# Patient Record
Sex: FEMALE | Race: WHITE | NOT HISPANIC OR LATINO | Employment: OTHER | URBAN - METROPOLITAN AREA
[De-identification: names, ages, dates, MRNs, and addresses within clinical notes are randomized per-mention and may not be internally consistent; named-entity substitution may affect disease eponyms.]

---

## 2023-10-09 ENCOUNTER — OFFICE VISIT (OUTPATIENT)
Dept: ORTHOPEDIC SURGERY | Facility: CLINIC | Age: 68
End: 2023-10-09
Payer: MEDICARE

## 2023-10-09 ENCOUNTER — ANCILLARY PROCEDURE (OUTPATIENT)
Dept: RADIOLOGY | Facility: CLINIC | Age: 68
End: 2023-10-09
Payer: MEDICARE

## 2023-10-09 DIAGNOSIS — M18.0 OSTEOARTHRITIS OF CARPOMETACARPAL (CMC) JOINTS OF BOTH THUMBS, UNSPECIFIED OSTEOARTHRITIS TYPE: Primary | ICD-10-CM

## 2023-10-09 DIAGNOSIS — G56.03 BILATERAL CARPAL TUNNEL SYNDROME: ICD-10-CM

## 2023-10-09 DIAGNOSIS — M18.0 OSTEOARTHRITIS OF CARPOMETACARPAL (CMC) JOINTS OF BOTH THUMBS, UNSPECIFIED OSTEOARTHRITIS TYPE: ICD-10-CM

## 2023-10-09 PROCEDURE — 73130 X-RAY EXAM OF HAND: CPT | Mod: BILATERAL PROCEDURE | Performed by: RADIOLOGY

## 2023-10-09 PROCEDURE — L3924 HFO WITHOUT JOINTS PRE OTS: HCPCS | Performed by: PHYSICIAN ASSISTANT

## 2023-10-09 PROCEDURE — 73130 X-RAY EXAM OF HAND: CPT | Mod: 50,FY

## 2023-10-09 RX ORDER — LIDOCAINE HYDROCHLORIDE 10 MG/ML
20 INJECTION INFILTRATION; PERINEURAL
Status: COMPLETED | OUTPATIENT
Start: 2023-10-09 | End: 2023-10-09

## 2023-10-09 RX ORDER — TRIAMCINOLONE ACETONIDE 40 MG/ML
20 INJECTION, SUSPENSION INTRA-ARTICULAR; INTRAMUSCULAR
Status: COMPLETED | OUTPATIENT
Start: 2023-10-09 | End: 2023-10-09

## 2023-10-09 RX ADMIN — LIDOCAINE HYDROCHLORIDE 20 ML: 10 INJECTION INFILTRATION; PERINEURAL at 16:40

## 2023-10-09 RX ADMIN — TRIAMCINOLONE ACETONIDE 20 MG: 40 INJECTION, SUSPENSION INTRA-ARTICULAR; INTRAMUSCULAR at 16:40

## 2023-10-09 NOTE — PROGRESS NOTES
68-year-old right-hand-dominant retired female presents to clinic today with complaints of bilateral hand tingling decreased strength and pain at the base of the thumbs.  This has been going on for several years which forced her to retire in 2011 as she previously did [employer].  It she has recently noticing worsening symptoms.  She wears bilateral wrist braces at nighttime with some mild improvement.  She has difficulty with pinching opening jars and holding onto items.  She has not had any specific treatment other than the bracing.  She does have a history of controlled diabetes.    Patient's self reported past medical history, medications, allergies, surgical history, family and social history as well as a 10 point review of systems has been documented in the new patient intake form and scanned into the patient's electronic medical record. Pertinent findings are documented in the HPI.    Physical Examination Findings:  Constitutional: Appears well-developed and well-nourished.  Head: Normocephalic and atraumatic.  Eyes: Pupils are equal and round.  Cardiovascular: Intact distal pulses.   Respiratory: Effort normal. No respiratory distress.  Neurologic: Alert and oriented to person, place, and time.  Skin: Skin is warm and dry.  Hematologic / Lymphatic: No lymphedema, lymphangitis.  Psychiatric: normal mood and affect. Behavior is normal.   Musculoskeletal: Bilateral hands Bony prominence at bilateral thumb CMC joints.  Positive CMC grind test bilaterally.  Pain with palpation over bilateral thumb CMC joints.  No MP joint hyper instability.  Positive Carla median nerve compression test bilaterally positive Tinel's sign bilaterally.  No thenar intrinsic muscle atrophy.  Fingers are warm and well-perfused.  Full digital finger range of motion without any triggering.    New x-rays taken today of bilateral hands reveal diffuse degenerative changes at the bilateral thumb CMC joints    Impression: Bilateral thumb CMC  arthritis, bilateral carpal tunnel syndrome    Plan: We had a long discussion today regarding both of these issues as well as treatment options at length.  In regards to her CMC joint arthritis we discussed conservative management with bracing topicals, steroid injections.  In regards to her carpal tunnel syndrome we have discussed continue nighttime extension splinting versus steroid injections for surgical intervention.  At this time she has elected to proceed with braces for bilateral thumbs and steroid injections for bilateral carpal tunnels.  These were provided today and tolerated well.  She will continue to monitor symptoms she will return to our office for recurrence or progression of symptoms.    Patient was prescribed a Comfort Cool braces for CMC arthritis. The patient has weakness, instability and/or deformity of their bilateral thumbs which requires stabilization from this orthosis to improve their function.      Verbal and written instructions for the use, wear schedule, cleaning and application of this item were given.  Patient was instructed that should the brace result in increased pain, decreased sensation, increased swelling, or an overall worsening of their medical condition, to please contact our office immediately.     Orthotic management and training was provided for skin care, modifications due to healing tissues, edema changes, interruption in skin integrity, and safety precautions with the orthosis.    Patient ID: Marge Prater is a 68 y.o. female.    Hand / UE Inj/Asp: bilateral carpal tunnel for carpal tunnel syndrome on 10/9/2023 4:40 PM  Indications: therapeutic  Details: 25 G needle  Medications (Right): 20 mL lidocaine 10 mg/mL (1 %); 20 mg triamcinolone acetonide 40 mg/mL  Medications (Left): 20 mg triamcinolone acetonide 40 mg/mL; 20 mL lidocaine 10 mg/mL (1 %)  Procedure, treatment alternatives, risks and benefits explained, specific risks discussed. Immediately prior to  procedure a time out was called to verify the correct patient, procedure, equipment, support staff and site/side marked as required.           Erika Lipscomb PA-C  Department of Orthopaedic Surgery  [facility]    Dictation performed with the use of voice recognition software. Syntax and grammatical errors may exist.

## 2023-10-10 ENCOUNTER — TELEPHONE (OUTPATIENT)
Dept: CARDIOLOGY | Facility: HOSPITAL | Age: 68
End: 2023-10-10
Payer: MEDICARE

## 2023-10-10 NOTE — TELEPHONE ENCOUNTER
Message: Patient called asking if Dr. Ron could look at a CT chest she had earlier this year from the clinic? Her provider who ordered it said it noted moderate coronary calcification.     She is also trying to send her carotid results from a community event. She has been hearing her blood swishing in her ears but has an appt with ENT this week to discuss that. She wasn't sure if that had anything to do with her carotid ?     Update 10/10/23:  Per Dr. Ron, he is aware of her coronary artery calcification and he is treating this with cholesterol and antihypertensive medications. I reiterated this information to the patient. She was agreeable and verbalized understanding.     Regarding her pulsatile tinnitus Dr. Ron suggests checking a carotid artery ultrasound. She saw ENT and her doctor ordered an MRA of the brain, CT of her temple bones, and carotid artery ultrasound.     Patient will call with further questions or concerns.

## 2024-02-14 ENCOUNTER — TELEPHONE (OUTPATIENT)
Dept: OBSTETRICS AND GYNECOLOGY | Facility: CLINIC | Age: 69
End: 2024-02-14
Payer: COMMERCIAL

## 2024-02-14 DIAGNOSIS — Z12.31 ENCOUNTER FOR SCREENING MAMMOGRAM FOR BREAST CANCER: Primary | ICD-10-CM

## 2024-03-05 ENCOUNTER — HOSPITAL ENCOUNTER (OUTPATIENT)
Dept: RADIOLOGY | Facility: HOSPITAL | Age: 69
Discharge: HOME | End: 2024-03-05
Payer: COMMERCIAL

## 2024-03-05 DIAGNOSIS — Z12.31 ENCOUNTER FOR SCREENING MAMMOGRAM FOR BREAST CANCER: ICD-10-CM

## 2024-03-05 PROCEDURE — 77067 SCR MAMMO BI INCL CAD: CPT

## 2024-03-05 PROCEDURE — 77067 SCR MAMMO BI INCL CAD: CPT | Performed by: RADIOLOGY

## 2024-03-05 PROCEDURE — 77063 BREAST TOMOSYNTHESIS BI: CPT | Performed by: RADIOLOGY

## 2024-03-06 ENCOUNTER — TELEPHONE (OUTPATIENT)
Dept: OBSTETRICS AND GYNECOLOGY | Facility: CLINIC | Age: 69
End: 2024-03-06
Payer: COMMERCIAL

## 2024-03-07 DIAGNOSIS — R92.8 ABNORMALITY OF BOTH BREASTS ON SCREENING MAMMOGRAM: Primary | ICD-10-CM

## 2024-03-18 ENCOUNTER — HOSPITAL ENCOUNTER (OUTPATIENT)
Dept: RADIOLOGY | Facility: HOSPITAL | Age: 69
Discharge: HOME | End: 2024-03-18
Payer: COMMERCIAL

## 2024-03-18 DIAGNOSIS — R92.8 ABNORMALITY OF LEFT BREAST ON SCREENING MAMMOGRAM: Primary | ICD-10-CM

## 2024-03-18 DIAGNOSIS — R92.8 ABNORMALITY OF BOTH BREASTS ON SCREENING MAMMOGRAM: ICD-10-CM

## 2024-03-18 PROCEDURE — 76642 ULTRASOUND BREAST LIMITED: CPT | Mod: 50,59

## 2024-03-18 PROCEDURE — 76982 USE 1ST TARGET LESION: CPT

## 2024-03-18 PROCEDURE — 76642 ULTRASOUND BREAST LIMITED: CPT | Mod: BILATERAL PROCEDURE | Performed by: STUDENT IN AN ORGANIZED HEALTH CARE EDUCATION/TRAINING PROGRAM

## 2024-04-29 NOTE — PROGRESS NOTES
PAP 12-3-19 NEG  MAMMO 3-5-2024 focal asymmetry R and L breast  Breast ultrasound bilateral 3-.  DEXA 19 osteopenia T=-2.0, nl FRAX. Plans repeat.   COLON 10-6-20    ASSESSMENT/PLAN    Normal breast and GYN exam.    Abnormality of both breasts on screening mammogram  Recent additional views normal.   Normal clinical breast exam.   Resume routine screening next year.     SUBJECTIVE    HPI    67 yo  for breast and GYN exam.   Last visit 4/15/2021.  S/p screening mammogram 3-5-2024. Bilateral breast asymmetries.   3- bilateral breast ultrasound. Left breast probable cyst/dilated duct. Recommend 6 month left breast ultrasound. Right brast ultrasound focal ductal ectasia, no further followup recommended.   Menopausal since age 44. Denies any bleeding.   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   Type 2 DM, followed by endocrine. Fatigue continues.  H/o anemia, recent ferritin, iron normal.  Some urgency, pressure of urination. UA, trace blood, recent 2024 urinalysis tr protein.  Nonsmoker.      Review of Systems    Constitutional: no fever, no chills, no recent weight gain, no recent weight loss and + fatigue.   Eyes: no eye pain, no vision problems and no dryness of the eyes.   ENT: no hearing loss, no nosebleeds and no sinus congestion.   Cardiovascular: no chest pain, no palpitations and no orthopnea.   Respiratory: no shortness of breath, no cough and no wheezing.   Gastrointestinal: no abdominal pain, no constipation, no nausea, no diarrhea and no vomiting.   Genitourinary: no pelvic pain, no dysuria, no urinary incontinence, no vaginal dryness, no vaginal itching, no dyspareunia, no dysmenorrhea, no sexual problems, + change in urinary frequency, no vaginal discharge, no unexplained vaginal bleeding and no lesion/sore.   Musculoskeletal: no back pain, no joint swelling and no leg edema.   Integumentary: no rashes, no skin lesions, no breast pain, no nipple  "discharge and no breast lump.   Neurological: no headache, no numbness and no dizziness.   Psychiatric: no sleep disturbances, no anxiety and no depression.   Endocrine: no hot flashes, no loss of hair and no hirsutism.   Hematologic/Lymphatic: no swollen glands, no tendency for easy bleeding and no tendency for easy bruising.      OBJECTIVE    /70   Ht 1.51 m (4' 11.45\")   Wt 78.5 kg (173 lb)   BMI 34.42 kg/m²      Physical Exam     Constitutional: Alert and in no acute distress. Well developed, well nourished   Head and Face: Head and face: normal   Eyes: Normal external exam - nonicteric sclera, extraocular movements intact (EOMI) and no ptosis.   Ears, Nose, Mouth, and Throat: External inspection of ears and nose: normal   Neck: no neck asymmetry. Supple and thyroid not enlarged and there were no palpable thyroid nodules   Cardiovascular: Heart rate and rhythm were normal, normal S1 and S2, no gallops, and no murmurs   Pulmonary: No respiratory distress and clear bilateral breath sounds   Chest: Breasts: normal appearance, no nipple discharge and no skin changes and palpation of breasts and axillae: no palpable mass and no axillary lymphadenopathy   Abdomen: soft nontender; no abdominal mass palpated, no organomegaly and no hernias   Genitourinary: external genitalia: normal, no inguinal lymphadenopathy, Bartholin's urethral and Canyon Day's glands: normal, urethra: normal, bladder: normal on palpation and perianal area: normal   Vagina: normal.   Cervix: Normal appearing without lesions.  Uterus: Pelvic exam limited by habitus. Normal, mobile, nontender.  Right Adnexa/parametria: Normal.   Left Adnexa/parametria: Normal.   Skin: normal skin color and pigmentation, normal skin turgor, and no rash.   Psychiatric: alert and oriented x 3., affect normal to patient baseline and mood: appropriate          Katia Lu MD    "

## 2024-04-30 ENCOUNTER — OFFICE VISIT (OUTPATIENT)
Dept: OBSTETRICS AND GYNECOLOGY | Facility: CLINIC | Age: 69
End: 2024-04-30
Payer: COMMERCIAL

## 2024-04-30 VITALS
DIASTOLIC BLOOD PRESSURE: 70 MMHG | WEIGHT: 173 LBS | SYSTOLIC BLOOD PRESSURE: 140 MMHG | HEIGHT: 59 IN | BODY MASS INDEX: 34.88 KG/M2

## 2024-04-30 DIAGNOSIS — R39.9 UTI SYMPTOMS: ICD-10-CM

## 2024-04-30 DIAGNOSIS — R92.8 ABNORMALITY OF BOTH BREASTS ON SCREENING MAMMOGRAM: Primary | ICD-10-CM

## 2024-04-30 LAB
POC APPEARANCE, URINE: CLEAR
POC BILIRUBIN, URINE: NEGATIVE
POC BLOOD, URINE: ABNORMAL
POC COLOR, URINE: YELLOW
POC GLUCOSE, URINE: NEGATIVE MG/DL
POC KETONES, URINE: NEGATIVE MG/DL
POC LEUKOCYTES, URINE: NEGATIVE
POC NITRITE,URINE: NEGATIVE
POC PH, URINE: 5 PH
POC PROTEIN, URINE: NEGATIVE MG/DL
POC SPECIFIC GRAVITY, URINE: 1.01
POC UROBILINOGEN, URINE: 1 EU/DL

## 2024-04-30 PROCEDURE — 81003 URINALYSIS AUTO W/O SCOPE: CPT | Performed by: OBSTETRICS & GYNECOLOGY

## 2024-04-30 PROCEDURE — 1160F RVW MEDS BY RX/DR IN RCRD: CPT | Performed by: OBSTETRICS & GYNECOLOGY

## 2024-04-30 PROCEDURE — 99213 OFFICE O/P EST LOW 20 MIN: CPT | Performed by: OBSTETRICS & GYNECOLOGY

## 2024-04-30 PROCEDURE — 1159F MED LIST DOCD IN RCRD: CPT | Performed by: OBSTETRICS & GYNECOLOGY

## 2024-04-30 RX ORDER — ESOMEPRAZOLE MAGNESIUM 40 MG/1
40 CAPSULE, DELAYED RELEASE ORAL DAILY
COMMUNITY

## 2024-04-30 RX ORDER — BLOOD-GLUCOSE,RECEIVER,CONT
EACH MISCELLANEOUS
COMMUNITY
Start: 2024-04-29

## 2024-04-30 RX ORDER — IBUPROFEN 800 MG/1
TABLET ORAL
COMMUNITY
Start: 2023-02-09

## 2024-04-30 RX ORDER — ROSUVASTATIN CALCIUM 20 MG/1
TABLET, COATED ORAL
COMMUNITY
Start: 2023-09-11

## 2024-04-30 RX ORDER — CHOLECALCIFEROL (VITAMIN D3) 25 MCG
1 TABLET ORAL DAILY
COMMUNITY
Start: 2020-09-16

## 2024-04-30 RX ORDER — MECLIZINE HCL 12.5 MG 12.5 MG/1
25 TABLET ORAL AS NEEDED
COMMUNITY

## 2024-04-30 RX ORDER — CYANOCOBALAMIN 1000 UG/ML
1000 INJECTION, SOLUTION INTRAMUSCULAR; SUBCUTANEOUS
COMMUNITY
Start: 2019-11-08

## 2024-04-30 RX ORDER — BUPROPION HYDROCHLORIDE 150 MG/1
150 TABLET ORAL DAILY
COMMUNITY

## 2024-04-30 RX ORDER — BLOOD-GLUCOSE SENSOR
EACH MISCELLANEOUS
COMMUNITY
Start: 2024-04-29

## 2024-04-30 RX ORDER — LEVOTHYROXINE, LIOTHYRONINE 19; 4.5 UG/1; UG/1
30 TABLET ORAL DAILY
COMMUNITY

## 2024-04-30 RX ORDER — ACETAMINOPHEN AND PHENYLEPHRINE HCL 325; 5 MG/1; MG/1
1 TABLET ORAL
COMMUNITY

## 2024-04-30 RX ORDER — HYDROCHLOROTHIAZIDE 12.5 MG/1
1 CAPSULE ORAL DAILY
COMMUNITY
Start: 2023-11-14

## 2024-04-30 RX ORDER — FERROUS SULFATE 325(65) MG
325 TABLET ORAL EVERY OTHER DAY
COMMUNITY
Start: 2020-09-16

## 2024-04-30 RX ORDER — NYSTATIN 500000 [USP'U]/1
TABLET, COATED ORAL
COMMUNITY
Start: 2019-09-18

## 2024-04-30 RX ORDER — CYCLOBENZAPRINE HCL 10 MG
TABLET ORAL
COMMUNITY
Start: 2016-05-04

## 2024-04-30 RX ORDER — NYSTATIN 1MM UNIT
POWDER (EA) MISCELLANEOUS
COMMUNITY
Start: 2023-12-08

## 2024-04-30 RX ORDER — SITAGLIPTIN AND METFORMIN HYDROCHLORIDE 500; 50 MG/1; MG/1
TABLET, FILM COATED ORAL
COMMUNITY

## 2024-04-30 RX ORDER — LISINOPRIL 10 MG/1
10 TABLET ORAL
COMMUNITY
Start: 2023-11-14

## 2024-04-30 RX ORDER — GABAPENTIN 100 MG/1
100 CAPSULE ORAL 3 TIMES DAILY
COMMUNITY

## 2024-04-30 RX ORDER — METOPROLOL SUCCINATE 50 MG/1
50 TABLET, EXTENDED RELEASE ORAL 2 TIMES DAILY
COMMUNITY

## 2024-04-30 RX ORDER — SYRINGE WITH NEEDLE, 1 ML 27GX1/2"
SYRINGE, EMPTY DISPOSABLE MISCELLANEOUS
COMMUNITY
Start: 2023-09-11

## 2024-04-30 RX ORDER — METRONIDAZOLE 7.5 MG/G
1 CREAM TOPICAL 2 TIMES DAILY
COMMUNITY
Start: 2019-11-08

## 2024-04-30 RX ORDER — FOLIC ACID/MULTIVIT,IRON,MINER 0.4MG-18MG
1 TABLET ORAL DAILY
COMMUNITY
Start: 2022-09-14

## 2024-04-30 RX ORDER — METFORMIN HYDROCHLORIDE 1000 MG/1
TABLET ORAL
COMMUNITY
Start: 2023-11-14

## 2024-08-30 ENCOUNTER — TELEPHONE (OUTPATIENT)
Dept: CARDIOLOGY | Facility: HOSPITAL | Age: 69
End: 2024-08-30
Payer: COMMERCIAL

## 2024-08-30 NOTE — TELEPHONE ENCOUNTER
Spoke to patient and let her know that Dr. Ron said she does not need a repeat calcium score. Patient verbalized understanding.

## 2024-08-30 NOTE — TELEPHONE ENCOUNTER
Patient wants to know if she can get an order for a CT calcium score. She was told [facility] does them for free and would like to have hers checked again. She had one done in 2017.

## 2024-09-03 ENCOUNTER — HOSPITAL ENCOUNTER (OUTPATIENT)
Dept: RADIOLOGY | Facility: HOSPITAL | Age: 69
Discharge: HOME | End: 2024-09-03
Payer: COMMERCIAL

## 2024-09-03 DIAGNOSIS — R92.8 ABNORMALITY OF LEFT BREAST ON SCREENING MAMMOGRAM: ICD-10-CM

## 2024-09-03 PROCEDURE — 76642 ULTRASOUND BREAST LIMITED: CPT | Mod: LT

## 2024-09-03 PROCEDURE — 76642 ULTRASOUND BREAST LIMITED: CPT | Mod: LEFT SIDE | Performed by: RADIOLOGY

## 2024-09-05 DIAGNOSIS — R92.8 ABNORMAL MAMMOGRAM OF RIGHT BREAST: Primary | ICD-10-CM

## 2024-09-05 DIAGNOSIS — R92.8 ABNORMALITY OF LEFT BREAST ON SCREENING MAMMOGRAM: Primary | ICD-10-CM

## 2024-09-12 ENCOUNTER — APPOINTMENT (OUTPATIENT)
Dept: CARDIOLOGY | Facility: HOSPITAL | Age: 69
End: 2024-09-12
Payer: COMMERCIAL

## 2024-09-13 ENCOUNTER — OFFICE VISIT (OUTPATIENT)
Dept: ORTHOPEDIC SURGERY | Facility: HOSPITAL | Age: 69
End: 2024-09-13
Payer: COMMERCIAL

## 2024-09-13 DIAGNOSIS — G56.02 CARPAL TUNNEL SYNDROME OF LEFT WRIST: Primary | ICD-10-CM

## 2024-09-13 DIAGNOSIS — G56.01 CARPAL TUNNEL SYNDROME OF RIGHT WRIST: ICD-10-CM

## 2024-09-13 PROCEDURE — 20526 THER INJECTION CARP TUNNEL: CPT | Mod: LT | Performed by: ORTHOPAEDIC SURGERY

## 2024-09-13 PROCEDURE — 1160F RVW MEDS BY RX/DR IN RCRD: CPT | Performed by: ORTHOPAEDIC SURGERY

## 2024-09-13 PROCEDURE — 1036F TOBACCO NON-USER: CPT | Performed by: ORTHOPAEDIC SURGERY

## 2024-09-13 PROCEDURE — 1159F MED LIST DOCD IN RCRD: CPT | Performed by: ORTHOPAEDIC SURGERY

## 2024-09-13 PROCEDURE — 2500000004 HC RX 250 GENERAL PHARMACY W/ HCPCS (ALT 636 FOR OP/ED): Performed by: ORTHOPAEDIC SURGERY

## 2024-09-13 PROCEDURE — 99213 OFFICE O/P EST LOW 20 MIN: CPT | Performed by: ORTHOPAEDIC SURGERY

## 2024-09-13 PROCEDURE — 20526 THER INJECTION CARP TUNNEL: CPT | Mod: RT | Performed by: ORTHOPAEDIC SURGERY

## 2024-09-13 PROCEDURE — 2500000005 HC RX 250 GENERAL PHARMACY W/O HCPCS: Performed by: ORTHOPAEDIC SURGERY

## 2024-09-13 NOTE — LETTER
September 14, 2024     Katia Lu MD  [address] [facility]  [place] [address]    Patient: Marge Prater   YOB: 1955   Date of Visit: 9/13/2024       Dear Dr. Katia Lu MD:    Thank you for referring Marge Prater to me for evaluation. Below are my notes for this consultation.  If you have questions, please do not hesitate to call me. I look forward to following your patient along with you.       Sincerely,     Art Latham MD      CC: No Recipients  ______________________________________________________________________________________    CHIEF COMPLAINT         Bilateral hand pain    ASSESSMENT + PLAN    Bilateral carpal tunnel syndrome, recurrent after remote injections    The nature of carpal tunnel syndrome was reviewed, along with the natural history.  I reviewed the options for management, including observation, nonsteroidals, splinting, oral steroids, cortisone injection, or surgical release, along with the likely success rates of each.  I reviewed the risks of cortisone injection, including infection, hypopigmentation, and fat atrophy.  The transient effect on blood glucose measurement was also reviewed, and the patient wished to proceed.    After sterile prep, I injected 30 mg of Kenalog and 1 cc of 1% lidocaine, plain to each carpal tunnel.    Patient will followup in 4 weeks if still symptomatic, or with any concerns, and will continue with the brace and nonsteroidals as needed.        HISTORY OF PRESENT ILLNESS       Patient is a 69 y.o. right-hand dominant female, who presents today for evaluation of bilateral hand numbness and tingling.  This has been present off and on for a couple of years.  She was seen by Erika Lipscomb in October 2023 and started on splints.  She also had bilateral cortisone injections.  Symptoms have been back for just a few weeks.  She is using the splints but is having breakthrough symptoms, waking with a  positive shake sign.  Symptoms are beginning the last end of the day.  She is interested in another set of injections today, as she feels she does not have time for surgery right now.  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      PHYSICAL EXAM    Constitutional:    Appears stated age. Well-developed and well-nourished obese female in no acute distress.  Psychiatric:         Pleasant normal mood and affect. Behavior is appropriate for the situation.   Head:                   Normocephalic and atraumatic.  Eyes:                    Pupils are equal and round.  Cardiovascular:  2+ radial and ulnar pulses. Fingers well-perfused.  Respiratory:        Effort normal. No respiratory distress. Speaking in complete sentences.  Neurologic:       Alert and oriented to person, place, and time.  Skin:                Skin is intact, warm and dry.  Hematologic / Lymphatic:    No lymphedema or lymphangitis.    Extremities / Musculoskeletal:                      Skin of both hands and wrists is intact with no erythema, ecchymosis, or diffuse swelling.  Normal skin drag and coloration.  No cortisone stigmata.  Full composite finger flexion extension with good intrinsic plus minus posture.  5/5 APB and hand intrinsics with no wasting.  Positive Phalen and Durkan bilaterally, a little more brisk on the left.  Negative Tinel at wrist and elbow.  Negative elbow flexion test.  Cervical range of motion is good and does not reproduce chief complaint.  Negative David.  Mild CMC shoulder signs bilaterally.  Tender at CMC, but not STT.  No MP hyperextension collapse.  No de Quervain's or thumb triggering.  Positive CMC grind on both sides, reproducing that portion of the chief complaint.  Negative distraction and aga totter test.  Sensation intact to light touch in all distributions.  Capillary refill less than 2 seconds.      IMAGING / LABS / EMGs           None today      Past Medical History:   Diagnosis Date   •  Fibromyalgia 11/08/2019    Fibromyalgia   • Obstructive sleep apnea (adult) (pediatric)     Obstructive sleep apnea   • Personal history of diseases of the blood and blood-forming organs and certain disorders involving the immune mechanism     History of anemia   • Personal history of other diseases of the circulatory system     History of hypertension   • Personal history of other diseases of the musculoskeletal system and connective tissue     History of osteoarthritis   • Personal history of other diseases of the musculoskeletal system and connective tissue     History of low back pain   • Personal history of other diseases of the respiratory system     Personal history of asthma   • Personal history of other endocrine, nutritional and metabolic disease     History of Hashimoto thyroiditis   • Personal history of other endocrine, nutritional and metabolic disease     History of diabetes mellitus   • Personal history of other endocrine, nutritional and metabolic disease     History of hyperlipidemia   • Personal history of other endocrine, nutritional and metabolic disease 05/22/2014    History of subacute thyroiditis   • Personal history of other mental and behavioral disorders     History of depression   • Personal history of other mental and behavioral disorders     History of anxiety disorder   • Personal history of other specified conditions 05/14/2015    History of diarrhea   • Personal history of other specified conditions     History of fatigue   • Polyp of stomach and duodenum     Hyperplastic polyps of stomach   • Thyrotoxicosis, unspecified without thyrotoxic crisis or storm     Hyperthyroidism   • Unspecified symptoms and signs involving the genitourinary system     UTI symptoms       Medication Documentation Review Audit       Reviewed by Katia Lu MD (Physician) on 04/30/24 at 1518      Medication Order Taking? Sig Documenting Provider Last Dose Status   BACILLUS COAGULANS-INULIN ORAL [id number]   Take by mouth. Historical MD Rey  Active   biotin 10,000 mcg capsule [id number]  Take 1 capsule (10 mg) by mouth once daily. Historical Provider, MD  Active   buPROPion XL (Wellbutrin XL) 150 mg 24 hr tablet [id number]  Take 1 tablet (150 mg) by mouth once daily. Historical MD Rey  Active   cetirizine (ZyrTEC) 10 mg capsule [id number]  Take 1 capsule (10 mg) by mouth once daily. Historical MD Rey  Active   cholecalciferol (Vitamin D-3) 25 MCG (1000 UT) tablet [id number] Yes Take 1 tablet (25 mcg) by mouth once daily. Historical Provider, MD  Active   cyanocobalamin (Vitamin B-12) 1,000 mcg/mL injection [id number] Yes Inject 1 mL (1,000 mcg) into the muscle every 30 (thirty) days. Historical MD Rey  Active   cyclobenzaprine (Flexeril) 10 mg tablet [id number] Yes TAKE 1 TABLET BY MOUTH DAILY AS NEEDED FOR MUSCLE CRAMPS/PAIN Historical Provider, MD  Active   esomeprazole (NexIUM) 40 mg DR capsule [id number]  Take 1 capsule (40 mg) by mouth once daily. Historical MD Rey  Active   ferrous sulfate, 325 mg ferrous sulfate, tablet [id number] Yes Take 1 tablet by mouth every other day. Historical Provider, MD  Active   FreeStyle Maren 3 Cowley misc [id number] Yes  Historical Provider, MD  Active   FreeStyle Maren 3 Sensor device [id number] Yes  Historical Provider, MD  Active   gabapentin (Neurontin) 100 mg capsule [id number]  Take 1 capsule (100 mg) by mouth 3 times a day. Historical MD Rey  Active   hydroCHLOROthiazide (Microzide) 12.5 mg capsule [id number] Yes Take 1 capsule (12.5 mg) by mouth once daily. Historical MD Rey  Active   ibuprofen 800 mg tablet [id number] Yes  Historical Provider, MD  Active   Janumet  mg tablet [id number]  TAKE 1 TABLET BY MOUTH TWICE DAILY WITH MEALS. TAKE IN ADDITION TO METFORMIN. Michele Le MD  Active   lisinopril 10 mg tablet [id number] Yes Take 1 tablet (10 mg) by mouth once daily. Historical MD Rey  Active   meclizine (Antivert) 12.5 mg  "tablet [id number]  Take 2 tablets (25 mg) by mouth if needed. Historical Provider, MD  Active   metFORMIN (Glucophage) 1,000 mg tablet [id number] Yes TAKE 1 TABLET BY MOUTH EVERY DAY, IN ADDITION TO JANUMET Historical Provider, MD  Active   metoprolol succinate XL (Toprol-XL) 50 mg 24 hr tablet [id number]  Take 1 tablet (50 mg) by mouth 2 times a day. Historical Provider, MD  Active   metroNIDAZOLE (Metrocream) 0.75 % cream [id number] Yes Apply 1 Application topically 2 times a day. Historical Provider, MD  Active   multivitamin with minerals (One Daily Women's) tablet [id number] Yes Take 1 tablet by mouth once daily. Historical Provider, MD  Active   NP Thyroid 30 mg tablet [id number]  Take 1 tablet (30 mg) by mouth once daily. Historical Provider, MD  Active   nystatin (Mycostatin) 500,000 unit tablet [id number] Yes Take by mouth. Historical Provider, MD  Active   nystatin, bulk, 1 million unit powder [id number] Yes Take 1,000,000 Units by mouth twice daily. Historical Provider, MD  Active   rosuvastatin (Crestor) 20 mg tablet [id number] Yes  Historical Provider, MD  Active   VanishPoint Syringe 1 mL 25 gauge x 1\" syringe [id number] Yes USE AS DIRECTED WITH ONCE MONTHLY INTRAMUSCULAR INJECTIONS OF VITAMIN B12 Historical Provider, MD  Active                    Allergies   Allergen Reactions   • Ciprofloxacin Other     Palpitations   • Erythromycin Base GI Upset     stomach upset   • Guaifenesin Hives     hives   • Dextromethorphan-Guaifenesin Rash       Social History     Socioeconomic History   • Marital status:      Spouse name: Not on file   • Number of children: Not on file   • Years of education: Not on file   • Highest education level: Not on file   Occupational History   • Not on file   Tobacco Use   • Smoking status: Never   • Smokeless tobacco: Never   Vaping Use   • Vaping status: Never Used   Substance and Sexual Activity   • Alcohol use: Never   • Drug use: Never   • Sexual activity: Not Currently   Other " Topics Concern   • Not on file   Social History Narrative   • Not on file     Social Determinants of Health     Financial Resource Strain: Not on file   Food Insecurity: Not on file   Transportation Needs: Not on file   Physical Activity: Not on file   Stress: Not on file   Social Connections: Not on file   Intimate Partner Violence: Not on file   Housing Stability: Not on file       Past Surgical History:   Procedure Laterality Date   • CATARACT EXTRACTION  2016    Cataract Surgery   •  SECTION, CLASSIC  10/21/2013     Section   • CT ANGIO CORONARY ART WITH HEARTFLOW IF SCORE >30%  2017    CT HEART CORONARY ANGIOGRAM 2017 [facility]   • GALLBLADDER SURGERY  10/21/2013    Gallbladder Surgery   • OTHER SURGICAL HISTORY  10/21/2013    Colonoscopic Polypectomy Via Colostomy   • TONSILLECTOMY  10/21/2013    Tonsillectomy With Adenoidectomy   • TUBAL LIGATION  10/21/2013    Tubal Ligation       PROCEDURE    Hand / UE Inj/Asp: R carpal tunnel for carpal tunnel syndrome on 2024 10:19 AM  Indications: therapeutic and diagnostic  Details: 25 G needle, volar approach  Medications: 30 mg triamcinolone acetonide 40 mg/mL; 0.75 mL lidocaine 10 mg/mL (1 %)  Outcome: tolerated well, no immediate complications  Procedure, treatment alternatives, risks and benefits explained, specific risks discussed. Consent was given by the patient.       Hand / UE Inj/Asp: L carpal tunnel for carpal tunnel syndrome on 2024 10:19 AM  Indications: therapeutic and diagnostic  Details: 25 G needle, volar approach  Medications: 30 mg triamcinolone acetonide 40 mg/mL; 0.75 mL lidocaine 10 mg/mL (1 %)  Outcome: tolerated well, no immediate complications  Procedure, treatment alternatives, risks and benefits explained, specific risks discussed. Consent was given by the patient.           Electronically Signed      ZENON Latham MD      Orthopaedic Hand Surgery      [phone]

## 2024-09-13 NOTE — PROGRESS NOTES
CHIEF COMPLAINT         Bilateral hand pain    ASSESSMENT + PLAN    Bilateral carpal tunnel syndrome, recurrent after remote injections    The nature of carpal tunnel syndrome was reviewed, along with the natural history.  I reviewed the options for management, including observation, nonsteroidals, splinting, oral steroids, cortisone injection, or surgical release, along with the likely success rates of each.  I reviewed the risks of cortisone injection, including infection, hypopigmentation, and fat atrophy.  The transient effect on blood glucose measurement was also reviewed, and the patient wished to proceed.    After sterile prep, I injected 30 mg of Kenalog and 1 cc of 1% lidocaine, plain to each carpal tunnel.    Patient will followup in 4 weeks if still symptomatic, or with any concerns, and will continue with the brace and nonsteroidals as needed.        HISTORY OF PRESENT ILLNESS       Patient is a 69 y.o. right-hand dominant female, who presents today for evaluation of bilateral hand numbness and tingling.  This has been present off and on for a couple of years.  She was seen by Erika Lipscomb in October 2023 and started on splints.  She also had bilateral cortisone injections.  Symptoms have been back for just a few weeks.  She is using the splints but is having breakthrough symptoms, waking with a positive shake sign.  Symptoms are beginning the last end of the day.  She is interested in another set of injections today, as she feels she does not have time for surgery right now.  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      PHYSICAL EXAM    Constitutional:    Appears stated age. Well-developed and well-nourished obese female in no acute distress.  Psychiatric:         Pleasant normal mood and affect. Behavior is appropriate for the situation.   Head:                   Normocephalic and atraumatic.  Eyes:                    Pupils are equal and round.  Cardiovascular:  2+ radial and  ulnar pulses. Fingers well-perfused.  Respiratory:        Effort normal. No respiratory distress. Speaking in complete sentences.  Neurologic:       Alert and oriented to person, place, and time.  Skin:                Skin is intact, warm and dry.  Hematologic / Lymphatic:    No lymphedema or lymphangitis.    Extremities / Musculoskeletal:                      Skin of both hands and wrists is intact with no erythema, ecchymosis, or diffuse swelling.  Normal skin drag and coloration.  No cortisone stigmata.  Full composite finger flexion extension with good intrinsic plus minus posture.  5/5 APB and hand intrinsics with no wasting.  Positive Phalen and Durkan bilaterally, a little more brisk on the left.  Negative Tinel at wrist and elbow.  Negative elbow flexion test.  Cervical range of motion is good and does not reproduce chief complaint.  Negative David.  Mild CMC shoulder signs bilaterally.  Tender at CMC, but not STT.  No MP hyperextension collapse.  No de Quervain's or thumb triggering.  Positive CMC grind on both sides, reproducing that portion of the chief complaint.  Negative distraction and aga totter test.  Sensation intact to light touch in all distributions.  Capillary refill less than 2 seconds.      IMAGING / LABS / EMGs           None today      Past Medical History:   Diagnosis Date    Fibromyalgia 11/08/2019    Fibromyalgia    Obstructive sleep apnea (adult) (pediatric)     Obstructive sleep apnea    Personal history of diseases of the blood and blood-forming organs and certain disorders involving the immune mechanism     History of anemia    Personal history of other diseases of the circulatory system     History of hypertension    Personal history of other diseases of the musculoskeletal system and connective tissue     History of osteoarthritis    Personal history of other diseases of the musculoskeletal system and connective tissue     History of low back pain    Personal history of other  diseases of the respiratory system     Personal history of asthma    Personal history of other endocrine, nutritional and metabolic disease     History of Hashimoto thyroiditis    Personal history of other endocrine, nutritional and metabolic disease     History of diabetes mellitus    Personal history of other endocrine, nutritional and metabolic disease     History of hyperlipidemia    Personal history of other endocrine, nutritional and metabolic disease 05/22/2014    History of subacute thyroiditis    Personal history of other mental and behavioral disorders     History of depression    Personal history of other mental and behavioral disorders     History of anxiety disorder    Personal history of other specified conditions 05/14/2015    History of diarrhea    Personal history of other specified conditions     History of fatigue    Polyp of stomach and duodenum     Hyperplastic polyps of stomach    Thyrotoxicosis, unspecified without thyrotoxic crisis or storm     Hyperthyroidism    Unspecified symptoms and signs involving the genitourinary system     UTI symptoms       Medication Documentation Review Audit       Reviewed by Katia Lu MD (Physician) on 04/30/24 at 1518      Medication Order Taking? Sig Documenting Provider Last Dose Status   BACILLUS COAGULANS-INULIN ORAL [id number]  Take by mouth. Historical Provider, MD  Active   biotin 10,000 mcg capsule [id number]  Take 1 capsule (10 mg) by mouth once daily. Historical Provider, MD  Active   buPROPion XL (Wellbutrin XL) 150 mg 24 hr tablet [id number]  Take 1 tablet (150 mg) by mouth once daily. Historical Provider, MD  Active   cetirizine (ZyrTEC) 10 mg capsule [id number]  Take 1 capsule (10 mg) by mouth once daily. Historical Provider, MD  Active   cholecalciferol (Vitamin D-3) 25 MCG (1000 UT) tablet [id number] Yes Take 1 tablet (25 mcg) by mouth once daily. Historical Provider, MD  Active   cyanocobalamin (Vitamin B-12) 1,000 mcg/mL injection [id number]  Yes Inject 1 mL (1,000 mcg) into the muscle every 30 (thirty) days. Historical Provider, MD  Active   cyclobenzaprine (Flexeril) 10 mg tablet [id number] Yes TAKE 1 TABLET BY MOUTH DAILY AS NEEDED FOR MUSCLE CRAMPS/PAIN Historical Provider, MD  Active   esomeprazole (NexIUM) 40 mg DR capsule [id number]  Take 1 capsule (40 mg) by mouth once daily. Historical Provider, MD  Active   ferrous sulfate, 325 mg ferrous sulfate, tablet [id number] Yes Take 1 tablet by mouth every other day. Historical Provider, MD  Active   FreeStyle Maren 3 [place] misc [id number] Yes  Historical Provider, MD  Active   FreeStyle Maren 3 Sensor device [id number] Yes  Historical Provider, MD  Active   gabapentin (Neurontin) 100 mg capsule [id number]  Take 1 capsule (100 mg) by mouth 3 times a day. Historical Provider, MD  Active   hydroCHLOROthiazide (Microzide) 12.5 mg capsule [id number] Yes Take 1 capsule (12.5 mg) by mouth once daily. Historical Provider, MD  Active   ibuprofen 800 mg tablet [id number] Yes  Historical Provider, MD  Active   Janumet  mg tablet [id number]  TAKE 1 TABLET BY MOUTH TWICE DAILY WITH MEALS. TAKE IN ADDITION TO METFORMIN. Historical Provider, MD  Active   lisinopril 10 mg tablet [id number] Yes Take 1 tablet (10 mg) by mouth once daily. Historical Provider, MD  Active   meclizine (Antivert) 12.5 mg tablet [id number]  Take 2 tablets (25 mg) by mouth if needed. Historical Provider, MD  Active   metFORMIN (Glucophage) 1,000 mg tablet [id number] Yes TAKE 1 TABLET BY MOUTH EVERY DAY, IN ADDITION TO JANUMET Historical Provider, MD  Active   metoprolol succinate XL (Toprol-XL) 50 mg 24 hr tablet [id number]  Take 1 tablet (50 mg) by mouth 2 times a day. Historical Provider, MD  Active   metroNIDAZOLE (Metrocream) 0.75 % cream [id number] Yes Apply 1 Application topically 2 times a day. Historical Provider, MD  Active   multivitamin with minerals (One Daily Women's) tablet [id number] Yes Take 1 tablet by mouth once daily. Historical  "Provider, MD  Active   NP Thyroid 30 mg tablet [id number]  Take 1 tablet (30 mg) by mouth once daily. Historical Provider, MD  Active   nystatin (Mycostatin) 500,000 unit tablet  Yes Take by mouth. Historical Provider, MD  Active   nystatin, bulk, 1 million unit powder  Yes Take 1,000,000 Units by mouth twice daily. Historical Provider, MD  Active   rosuvastatin (Crestor) 20 mg tablet  Yes  Historical Provider, MD  Active   VanishPoint Syringe 1 mL 25 gauge x 1\" syringe  Yes USE AS DIRECTED WITH ONCE MONTHLY INTRAMUSCULAR INJECTIONS OF VITAMIN B12 Historical Provider, MD  Active                    Allergies   Allergen Reactions    Ciprofloxacin Other     Palpitations    Erythromycin Base GI Upset     stomach upset    Guaifenesin Hives     hives    Dextromethorphan-Guaifenesin Rash       Social History     Socioeconomic History    Marital status:      Spouse name: Not on file    Number of children: Not on file    Years of education: Not on file    Highest education level: Not on file   Occupational History    Not on file   Tobacco Use    Smoking status: Never    Smokeless tobacco: Never   Vaping Use    Vaping status: Never Used   Substance and Sexual Activity    Alcohol use: Never    Drug use: Never    Sexual activity: Not Currently   Other Topics Concern    Not on file   Social History Narrative    Not on file     Social Determinants of Health     Financial Resource Strain: Not on file   Food Insecurity: Not on file   Transportation Needs: Not on file   Physical Activity: Not on file   Stress: Not on file   Social Connections: Not on file   Intimate Partner Violence: Not on file   Housing Stability: Not on file       Past Surgical History:   Procedure Laterality Date    CATARACT EXTRACTION  2016    Cataract Surgery     SECTION, CLASSIC  10/21/2013     Section    CT ANGIO CORONARY ART WITH HEARTFLOW IF SCORE >30%  2017    CT HEART CORONARY " ANGIOGRAM 12/14/2017 [facility]    GALLBLADDER SURGERY  10/21/2013    Gallbladder Surgery    OTHER SURGICAL HISTORY  10/21/2013    Colonoscopic Polypectomy Via Colostomy    TONSILLECTOMY  10/21/2013    Tonsillectomy With Adenoidectomy    TUBAL LIGATION  10/21/2013    Tubal Ligation       PROCEDURE    Hand / UE Inj/Asp: R carpal tunnel for carpal tunnel syndrome on 9/13/2024 10:19 AM  Indications: therapeutic and diagnostic  Details: 25 G needle, volar approach  Medications: 30 mg triamcinolone acetonide 40 mg/mL; 0.75 mL lidocaine 10 mg/mL (1 %)  Outcome: tolerated well, no immediate complications  Procedure, treatment alternatives, risks and benefits explained, specific risks discussed. Consent was given by the patient.       Hand / UE Inj/Asp: L carpal tunnel for carpal tunnel syndrome on 9/13/2024 10:19 AM  Indications: therapeutic and diagnostic  Details: 25 G needle, volar approach  Medications: 30 mg triamcinolone acetonide 40 mg/mL; 0.75 mL lidocaine 10 mg/mL (1 %)  Outcome: tolerated well, no immediate complications  Procedure, treatment alternatives, risks and benefits explained, specific risks discussed. Consent was given by the patient.           Electronically Signed      ZENON Latham MD      Orthopaedic Hand Surgery      [phone]

## 2024-09-14 PROBLEM — G56.01 CARPAL TUNNEL SYNDROME OF RIGHT WRIST: Status: ACTIVE | Noted: 2024-09-14

## 2024-09-14 PROBLEM — G56.02 CARPAL TUNNEL SYNDROME OF LEFT WRIST: Status: ACTIVE | Noted: 2024-09-14

## 2024-09-14 RX ORDER — TRIAMCINOLONE ACETONIDE 40 MG/ML
30 INJECTION, SUSPENSION INTRA-ARTICULAR; INTRAMUSCULAR
Status: COMPLETED | OUTPATIENT
Start: 2024-09-13 | End: 2024-09-13

## 2024-09-14 RX ORDER — LIDOCAINE HYDROCHLORIDE 10 MG/ML
0.75 INJECTION INFILTRATION; PERINEURAL
Status: COMPLETED | OUTPATIENT
Start: 2024-09-13 | End: 2024-09-13

## 2024-09-17 ENCOUNTER — APPOINTMENT (OUTPATIENT)
Dept: ORTHOPEDIC SURGERY | Facility: HOSPITAL | Age: 69
End: 2024-09-17
Payer: COMMERCIAL

## 2025-03-04 ENCOUNTER — HOSPITAL ENCOUNTER (OUTPATIENT)
Dept: RADIOLOGY | Facility: HOSPITAL | Age: 70
Discharge: HOME | End: 2025-03-04
Payer: MEDICARE

## 2025-03-04 VITALS — HEIGHT: 59 IN | BODY MASS INDEX: 34.88 KG/M2 | WEIGHT: 173 LBS

## 2025-03-04 DIAGNOSIS — R92.8 ABNORMALITY OF LEFT BREAST ON SCREENING MAMMOGRAM: ICD-10-CM

## 2025-03-04 PROCEDURE — 76981 USE PARENCHYMA: CPT | Mod: LT

## 2025-03-04 PROCEDURE — 77062 BREAST TOMOSYNTHESIS BI: CPT

## 2025-03-04 PROCEDURE — 76642 ULTRASOUND BREAST LIMITED: CPT | Mod: LT

## 2025-03-05 DIAGNOSIS — R92.8 ABNORMAL MAMMOGRAM OF RIGHT BREAST: Primary | ICD-10-CM

## 2025-03-05 NOTE — RESULT ENCOUNTER NOTE
TC with pt regarding benign findings on mammogram and ultrasound. Recommend one year diagnostic mammogram. Recommend a visit sooner if any changes in breast exam.   Order for bilateral diagnostic mammogram placed for 3/2026.

## 2025-04-23 ENCOUNTER — OFFICE VISIT (OUTPATIENT)
Dept: URGENT CARE | Age: 70
End: 2025-04-23
Payer: MEDICARE

## 2025-04-23 VITALS
DIASTOLIC BLOOD PRESSURE: 73 MMHG | SYSTOLIC BLOOD PRESSURE: 120 MMHG | RESPIRATION RATE: 18 BRPM | OXYGEN SATURATION: 98 % | TEMPERATURE: 98.5 F | HEART RATE: 66 BPM

## 2025-04-23 DIAGNOSIS — J02.9 SORE THROAT: ICD-10-CM

## 2025-04-23 DIAGNOSIS — J01.10 ACUTE NON-RECURRENT FRONTAL SINUSITIS: Primary | ICD-10-CM

## 2025-04-23 DIAGNOSIS — J04.0 LARYNGITIS: ICD-10-CM

## 2025-04-23 LAB
POC RAPID MONO: NEGATIVE
POC RAPID STREP: NEGATIVE

## 2025-04-23 PROCEDURE — 99203 OFFICE O/P NEW LOW 30 MIN: CPT | Performed by: NURSE PRACTITIONER

## 2025-04-23 PROCEDURE — 1159F MED LIST DOCD IN RCRD: CPT | Performed by: NURSE PRACTITIONER

## 2025-04-23 PROCEDURE — 86308 HETEROPHILE ANTIBODY SCREEN: CPT | Performed by: NURSE PRACTITIONER

## 2025-04-23 PROCEDURE — 87880 STREP A ASSAY W/OPTIC: CPT | Performed by: NURSE PRACTITIONER

## 2025-04-23 RX ORDER — AMOXICILLIN AND CLAVULANATE POTASSIUM 875; 125 MG/1; MG/1
1 TABLET, FILM COATED ORAL 2 TIMES DAILY
Qty: 20 TABLET | Refills: 0 | Status: SHIPPED | OUTPATIENT
Start: 2025-04-23 | End: 2025-05-03

## 2025-04-23 ASSESSMENT — ENCOUNTER SYMPTOMS
CONSTITUTIONAL NEGATIVE: 1
MUSCULOSKELETAL NEGATIVE: 1
PSYCHIATRIC NEGATIVE: 1
HEADACHES: 1
CARDIOVASCULAR NEGATIVE: 1
SINUS PRESSURE: 1
RHINORRHEA: 1
RESPIRATORY NEGATIVE: 1
SORE THROAT: 1
SINUS PAIN: 1

## 2025-04-23 NOTE — PROGRESS NOTES
Subjective   Patient ID: Marge Prater is a 69 y.o. female. They present today with a chief complaint of Laryngitis, Sinus headache, cervical lymph node tenderness x 4 weeks.  Taking mucinex, it is not working        Past Medical History  Allergies as of 04/23/2025 - Reviewed 04/23/2025   Allergen Reaction Noted    Ciprofloxacin Other 09/28/2017    Erythromycin base GI Upset 04/29/2002    Guaifenesin Hives 04/29/2002    Dextromethorphan-guaifenesin Rash 04/30/2024       Prescriptions Prior to Admission[1]     Medical History[2]    Surgical History[3]     reports that she has never smoked. She has never used smokeless tobacco. She reports that she does not drink alcohol and does not use drugs.    Review of Systems  Review of Systems   Constitutional: Negative.    HENT:  Positive for congestion, postnasal drip, rhinorrhea, sinus pressure, sinus pain and sore throat.    Respiratory: Negative.     Cardiovascular: Negative.    Musculoskeletal: Negative.    Neurological:  Positive for headaches.   Psychiatric/Behavioral: Negative.                                    Objective    Vitals:    04/23/25 1840   BP: 120/73   Pulse: 66   Resp: 18   Temp: 36.9 °C (98.5 °F)   SpO2: 98%     No LMP recorded. Patient is postmenopausal.    Physical Exam  General: Vitals noted, no distress, afebrile. Normal phonation, no stridor, no trismus  ENT: TMs bulging with cloudy effusion bilaterally, EACs unremarkable. Tender maxillary sinus.  Posterior oropharynx-mucous drainage, Uvula is in the midline and non-edematous. No Derrick's angina.  Neck: Supple. No meningismus through full range of motion. No lymphadenopathy.   Cardiac: Regular rate and rhythm, no murmur.  Lungs: Good aeration throughout. No adventitious breath sounds.   Abdomen: Soft, nontender, nonsurgical throughout. Normoactive bowel sounds.   Extremities: No peripheral edema  Skin: No rash  Neuro: No focal neurologic deficits.     Procedures    Point of Care Test & Imaging  Results from this visit  Results for orders placed or performed in visit on 04/23/25   POCT Infectious mononucleosis antibody manually resulted   Result Value Ref Range    POC Rapid Mono Negative Negative   POCT rapid strep A manually resulted   Result Value Ref Range    POC Rapid Strep Negative Negative      Imaging  No results found.    Cardiology, Vascular, and Other Imaging  No other imaging results found for the past 2 days      Diagnostic study results (if any) were reviewed by EDSIRAE Marquez.    Assessment/Plan   Allergies, medications, history, and pertinent labs/EKGs/Imaging reviewed by DESIRAE Marquez.     Medical Decision Making  History and examination consistent with acute uncomplicated sinusitis. No  indication for labs or imaging at this time. No evidence of sepsis, strep pharyngitis, or  pneumonia. Counseled patient/family on antibiotic treatment and supportive measures at  home. Patient advised to return to clinic or present to ED if symptoms change or worsen.  Otherwise follow-up with PCP.     Orders and Diagnoses  Diagnoses and all orders for this visit:  Acute non-recurrent frontal sinusitis  -     amoxicillin-clavulanate (Augmentin) 875-125 mg tablet; Take 1 tablet by mouth 2 times a day for 10 days.  Sore throat  -     POCT Infectious mononucleosis antibody manually resulted  -     POCT rapid strep A manually resulted  Laryngitis  -     POCT Infectious mononucleosis antibody manually resulted  -     POCT rapid strep A manually resulted      Medical Admin Record      Patient disposition: Home    Electronically signed by DESIRAE Marquez  7:17 PM           [1] (Not in a hospital admission)   [2]   Past Medical History:  Diagnosis Date    Breast cyst 2023    Fibromyalgia 11/08/2019    Fibromyalgia    Obstructive sleep apnea (adult) (pediatric)     Obstructive sleep apnea    Personal history of diseases of the blood and blood-forming organs and certain disorders involving  the immune mechanism     History of anemia    Personal history of other diseases of the circulatory system     History of hypertension    Personal history of other diseases of the musculoskeletal system and connective tissue     History of osteoarthritis    Personal history of other diseases of the musculoskeletal system and connective tissue     History of low back pain    Personal history of other diseases of the respiratory system     Personal history of asthma    Personal history of other endocrine, nutritional and metabolic disease     History of Hashimoto thyroiditis    Personal history of other endocrine, nutritional and metabolic disease     History of diabetes mellitus    Personal history of other endocrine, nutritional and metabolic disease     History of hyperlipidemia    Personal history of other endocrine, nutritional and metabolic disease 2014    History of subacute thyroiditis    Personal history of other mental and behavioral disorders     History of depression    Personal history of other mental and behavioral disorders     History of anxiety disorder    Personal history of other specified conditions 2015    History of diarrhea    Personal history of other specified conditions     History of fatigue    Polyp of stomach and duodenum     Hyperplastic polyps of stomach    Thyrotoxicosis, unspecified without thyrotoxic crisis or storm     Hyperthyroidism    Unspecified symptoms and signs involving the genitourinary system     UTI symptoms   [3]   Past Surgical History:  Procedure Laterality Date    CATARACT EXTRACTION  2016    Cataract Surgery     SECTION, CLASSIC  10/21/2013     Section    CT ANGIO CORONARY ART WITH HEARTFLOW IF SCORE >30%  2017    CT HEART CORONARY ANGIOGRAM 2017 [facility]    GALLBLADDER SURGERY  10/21/2013    Gallbladder Surgery    OTHER SURGICAL HISTORY  10/21/2013    Colonoscopic Polypectomy Via Colostomy    TONSILLECTOMY   10/21/2013    Tonsillectomy With Adenoidectomy    TUBAL LIGATION  10/21/2013    Tubal Ligation

## 2025-09-02 ENCOUNTER — OFFICE VISIT (OUTPATIENT)
Dept: ORTHOPEDIC SURGERY | Facility: HOSPITAL | Age: 70
End: 2025-09-02
Payer: MEDICARE

## 2025-09-02 VITALS — BODY MASS INDEX: 34.88 KG/M2 | HEIGHT: 59 IN | WEIGHT: 173 LBS

## 2025-09-02 DIAGNOSIS — G56.03 BILATERAL CARPAL TUNNEL SYNDROME: Primary | ICD-10-CM

## 2025-09-02 PROCEDURE — 3008F BODY MASS INDEX DOCD: CPT | Performed by: ORTHOPAEDIC SURGERY

## 2025-09-02 PROCEDURE — 99213 OFFICE O/P EST LOW 20 MIN: CPT | Performed by: ORTHOPAEDIC SURGERY

## 2025-09-02 PROCEDURE — 2500000004 HC RX 250 GENERAL PHARMACY W/ HCPCS (ALT 636 FOR OP/ED): Performed by: ORTHOPAEDIC SURGERY

## 2025-09-02 PROCEDURE — 1036F TOBACCO NON-USER: CPT | Performed by: ORTHOPAEDIC SURGERY

## 2025-09-02 PROCEDURE — 1159F MED LIST DOCD IN RCRD: CPT | Performed by: ORTHOPAEDIC SURGERY

## 2025-09-02 PROCEDURE — 20526 THER INJECTION CARP TUNNEL: CPT | Mod: 50 | Performed by: ORTHOPAEDIC SURGERY

## 2025-09-02 PROCEDURE — 99212 OFFICE O/P EST SF 10 MIN: CPT | Mod: 25

## 2025-09-02 RX ORDER — TRIAMCINOLONE ACETONIDE 40 MG/ML
20 INJECTION, SUSPENSION INTRA-ARTICULAR; INTRAMUSCULAR
Status: COMPLETED | OUTPATIENT
Start: 2025-09-02 | End: 2025-09-02

## 2025-09-02 RX ORDER — LIDOCAINE HYDROCHLORIDE 10 MG/ML
0.5 INJECTION, SOLUTION INFILTRATION; PERINEURAL
Status: COMPLETED | OUTPATIENT
Start: 2025-09-02 | End: 2025-09-02

## 2025-09-02 RX ADMIN — LIDOCAINE HYDROCHLORIDE 0.5 ML: 10 INJECTION, SOLUTION INFILTRATION; PERINEURAL at 16:02

## 2025-09-02 RX ADMIN — TRIAMCINOLONE ACETONIDE 20 MG: 40 INJECTION, SUSPENSION INTRA-ARTICULAR; INTRAMUSCULAR at 16:02

## 2025-10-07 ENCOUNTER — APPOINTMENT (OUTPATIENT)
Dept: GASTROENTEROLOGY | Facility: EXTERNAL LOCATION | Age: 70
End: 2025-10-07
Payer: MEDICARE